# Patient Record
Sex: FEMALE | Race: WHITE | NOT HISPANIC OR LATINO | Employment: OTHER | URBAN - METROPOLITAN AREA
[De-identification: names, ages, dates, MRNs, and addresses within clinical notes are randomized per-mention and may not be internally consistent; named-entity substitution may affect disease eponyms.]

---

## 2019-07-08 NOTE — PATIENT DISCUSSION
Start Valtrex 500 bid PO.  Vigamox QID OS.  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  FU in 48 hours.

## 2020-06-09 NOTE — PATIENT DISCUSSION
The patient has occludable angles. Laser peripheral iridotomy is recommended. The risks, benefits, and alternatives to surgery were discussed. The patient elects to proceed with surgery.  OD first.

## 2020-06-18 NOTE — PROCEDURE NOTE: SURGICAL
<p>Prior to commencing surgery, patient identification, surgical procedure, site, and side were confirmed by Dr. Hua Minor. Following topical proparacaine anesthesia, the patient was positioned at the YAG laser, a contact lens coupled to the cornea with methylcellulose and a peripheral iridotomy placed using 9.9 Mj x 24. without complication. Excess methylcellulose was washed from the eyes, one drop of Econopred Plus 1% instilled and the patient returned to the holding area having tolerated the procedure well and without complication. </p><p>MRN:[id number]</p>

## 2020-12-10 ENCOUNTER — NEW PATIENT COMPREHENSIVE (OUTPATIENT)
Dept: URBAN - METROPOLITAN AREA CLINIC 36 | Facility: CLINIC | Age: 65
End: 2020-12-10

## 2020-12-10 DIAGNOSIS — H25.811: ICD-10-CM

## 2020-12-10 DIAGNOSIS — H04.123: ICD-10-CM

## 2020-12-10 DIAGNOSIS — H25.812: ICD-10-CM

## 2020-12-10 DIAGNOSIS — H52.7: ICD-10-CM

## 2020-12-10 PROCEDURE — 92004 COMPRE OPH EXAM NEW PT 1/>: CPT

## 2020-12-10 PROCEDURE — 92015 DETERMINE REFRACTIVE STATE: CPT

## 2020-12-10 ASSESSMENT — VISUAL ACUITY
OS_SC: 20/200
OS_CC: 20/40
OD_SC: 20/400
OD_CC: 20/40-2
OD_SC: J1+
OS_SC: J1+

## 2020-12-10 ASSESSMENT — TONOMETRY
OD_IOP_MMHG: 18
OS_IOP_MMHG: 18

## 2024-06-05 ENCOUNTER — NEW PATIENT (OUTPATIENT)
Dept: URBAN - METROPOLITAN AREA CLINIC 46 | Facility: CLINIC | Age: 69
End: 2024-06-05

## 2024-06-05 DIAGNOSIS — H25.813: ICD-10-CM

## 2024-06-05 DIAGNOSIS — H52.7: ICD-10-CM

## 2024-06-05 DIAGNOSIS — H04.123: ICD-10-CM

## 2024-06-05 PROCEDURE — 92015 DETERMINE REFRACTIVE STATE: CPT

## 2024-06-05 PROCEDURE — 92004 COMPRE OPH EXAM NEW PT 1/>: CPT

## 2024-06-05 PROCEDURE — 92310-1 LEVEL 1 CONTACT LENS MANAGEMENT

## 2024-06-05 ASSESSMENT — TONOMETRY
OD_IOP_MMHG: 16
OS_IOP_MMHG: 16

## 2024-06-05 ASSESSMENT — VISUAL ACUITY
OS_CC: 20/30-1
OD_CC: 20/25
OS_OTHER: FL-.50
OU_CC: J1+
OD_CC: J1+
OU_CC: 20/20-1
OS_CC: J1+

## 2025-01-24 NOTE — PROCEDURE NOTE: SURGICAL
<p>Prior to commencing surgery, patient identification, surgical procedure, site, and side were confirmed by Dr. Mel Campos. Following topical proparacaine anesthesia, the patient was positioned at the YAG laser, a contact lens coupled to the cornea with methylcellulose and a peripheral iridotomy placed using 9.9 Mj x 81. without complication. Excess methylcellulose was washed from the eyes, one drop of Econopred Plus 1% instilled and the patient returned to the holding area having tolerated the procedure well and without complication. </p><p>MRN:[id number]</p>
[id number]

## 2025-06-09 ENCOUNTER — COMPREHENSIVE EXAM (OUTPATIENT)
Age: 70
End: 2025-06-09

## 2025-06-09 DIAGNOSIS — H52.7: ICD-10-CM

## 2025-06-09 DIAGNOSIS — H25.813: ICD-10-CM

## 2025-06-09 DIAGNOSIS — H04.123: ICD-10-CM

## 2025-06-09 DIAGNOSIS — H43.813: ICD-10-CM

## 2025-06-09 PROCEDURE — 92014 COMPRE OPH EXAM EST PT 1/>: CPT

## 2025-06-09 PROCEDURE — 92310-1 LEVEL 1 SOFT LENS UPDATE

## 2025-06-09 PROCEDURE — 92015 DETERMINE REFRACTIVE STATE: CPT

## 2025-08-27 ENCOUNTER — APPOINTMENT (OUTPATIENT)
Dept: URBAN - METROPOLITAN AREA CLINIC 135 | Facility: CLINIC | Age: 70
Setting detail: DERMATOLOGY
End: 2025-08-27

## 2025-08-27 DIAGNOSIS — L82.1 OTHER SEBORRHEIC KERATOSIS: ICD-10-CM

## 2025-08-27 DIAGNOSIS — L57.8 OTHER SKIN CHANGES DUE TO CHRONIC EXPOSURE TO NONIONIZING RADIATION: ICD-10-CM | Status: INADEQUATELY CONTROLLED

## 2025-08-27 DIAGNOSIS — L82.0 INFLAMED SEBORRHEIC KERATOSIS: ICD-10-CM

## 2025-08-27 PROCEDURE — ?

## 2025-08-27 PROCEDURE — ?: Mod: 25

## 2025-08-27 PROCEDURE — ? COUNSELING

## 2025-08-27 PROCEDURE — ? LIQUID NITROGEN

## 2025-08-27 ASSESSMENT — LOCATION DETAILED DESCRIPTION DERM
LOCATION DETAILED: LEFT CHIN
LOCATION DETAILED: LEFT LATERAL FOREHEAD
LOCATION DETAILED: LEFT CENTRAL MALAR CHEEK
LOCATION DETAILED: RIGHT INFERIOR MEDIAL FOREHEAD

## 2025-08-27 ASSESSMENT — LOCATION SIMPLE DESCRIPTION DERM
LOCATION SIMPLE: RIGHT FOREHEAD
LOCATION SIMPLE: CHIN
LOCATION SIMPLE: LEFT FOREHEAD
LOCATION SIMPLE: LEFT CHEEK

## 2025-08-27 ASSESSMENT — LOCATION ZONE DERM: LOCATION ZONE: FACE
